# Patient Record
Sex: MALE | Race: WHITE | NOT HISPANIC OR LATINO | URBAN - METROPOLITAN AREA
[De-identification: names, ages, dates, MRNs, and addresses within clinical notes are randomized per-mention and may not be internally consistent; named-entity substitution may affect disease eponyms.]

---

## 2017-04-23 ENCOUNTER — HOSPITAL ENCOUNTER (OUTPATIENT)
Dept: MRI IMAGING | Facility: CLINIC | Age: 42
Discharge: HOME | End: 2017-04-23
Attending: PODIATRIST | Admitting: PODIATRIST
Payer: COMMERCIAL

## 2017-04-23 DIAGNOSIS — M79.673 FOOT PAIN: ICD-10-CM

## 2017-04-23 PROCEDURE — 73718 MRI LOWER EXTREMITY W/O DYE: CPT | Mod: LT

## 2017-04-27 ENCOUNTER — TRANSFERRED RECORDS (OUTPATIENT)
Dept: HEALTH INFORMATION MANAGEMENT | Facility: CLINIC | Age: 42
End: 2017-04-27

## 2017-05-02 ENCOUNTER — ANESTHESIA EVENT (OUTPATIENT)
Dept: SURGERY | Facility: CLINIC | Age: 42
End: 2017-05-02
Payer: COMMERCIAL

## 2017-05-08 ENCOUNTER — HOSPITAL ENCOUNTER (OUTPATIENT)
Facility: CLINIC | Age: 42
Discharge: HOME | End: 2017-05-08
Attending: PODIATRIST | Admitting: PODIATRIST
Payer: COMMERCIAL

## 2017-05-08 ENCOUNTER — APPOINTMENT (OUTPATIENT)
Dept: GENERAL RADIOLOGY | Facility: CLINIC | Age: 42
End: 2017-05-08
Attending: PODIATRIST
Payer: COMMERCIAL

## 2017-05-08 ENCOUNTER — ANESTHESIA (OUTPATIENT)
Dept: SURGERY | Facility: CLINIC | Age: 42
End: 2017-05-08
Payer: COMMERCIAL

## 2017-05-08 VITALS
HEIGHT: 72 IN | BODY MASS INDEX: 34.54 KG/M2 | RESPIRATION RATE: 15 BRPM | OXYGEN SATURATION: 95 % | SYSTOLIC BLOOD PRESSURE: 164 MMHG | DIASTOLIC BLOOD PRESSURE: 88 MMHG | WEIGHT: 255 LBS | TEMPERATURE: 97.7 F

## 2017-05-08 PROCEDURE — 88305 TISSUE EXAM BY PATHOLOGIST: CPT | Mod: 26 | Performed by: PODIATRIST

## 2017-05-08 PROCEDURE — 25000128 H RX IP 250 OP 636: Performed by: NURSE ANESTHETIST, CERTIFIED REGISTERED

## 2017-05-08 PROCEDURE — S0020 INJECTION, BUPIVICAINE HYDRO: HCPCS | Performed by: PODIATRIST

## 2017-05-08 PROCEDURE — 25000125 ZZHC RX 250: Performed by: PODIATRIST

## 2017-05-08 PROCEDURE — 36000058 ZZH SURGERY LEVEL 3 EA 15 ADDTL MIN: Performed by: PODIATRIST

## 2017-05-08 PROCEDURE — 71000027 ZZH RECOVERY PHASE 2 EACH 15 MINS: Performed by: PODIATRIST

## 2017-05-08 PROCEDURE — 25000132 ZZH RX MED GY IP 250 OP 250 PS 637: Performed by: PODIATRIST

## 2017-05-08 PROCEDURE — 25800025 ZZH RX 258: Performed by: NURSE ANESTHETIST, CERTIFIED REGISTERED

## 2017-05-08 PROCEDURE — 25000125 ZZHC RX 250: Performed by: NURSE ANESTHETIST, CERTIFIED REGISTERED

## 2017-05-08 PROCEDURE — 40000277 XR SURGERY CARM FLUORO LESS THAN 5 MIN W STILLS

## 2017-05-08 PROCEDURE — 36000060 ZZH SURGERY LEVEL 3 W FLUORO 1ST 30 MIN: Performed by: PODIATRIST

## 2017-05-08 PROCEDURE — 37000009 ZZH ANESTHESIA TECHNICAL FEE, EACH ADDTL 15 MIN: Performed by: PODIATRIST

## 2017-05-08 PROCEDURE — 25000128 H RX IP 250 OP 636: Performed by: PODIATRIST

## 2017-05-08 PROCEDURE — C1713 ANCHOR/SCREW BN/BN,TIS/BN: HCPCS | Performed by: PODIATRIST

## 2017-05-08 PROCEDURE — 40000305 ZZH STATISTIC PRE PROC ASSESS I: Performed by: PODIATRIST

## 2017-05-08 PROCEDURE — 27210794 ZZH OR GENERAL SUPPLY STERILE: Performed by: PODIATRIST

## 2017-05-08 PROCEDURE — C1769 GUIDE WIRE: HCPCS | Performed by: PODIATRIST

## 2017-05-08 PROCEDURE — 88305 TISSUE EXAM BY PATHOLOGIST: CPT | Performed by: PODIATRIST

## 2017-05-08 PROCEDURE — 37000008 ZZH ANESTHESIA TECHNICAL FEE, 1ST 30 MIN: Performed by: PODIATRIST

## 2017-05-08 DEVICE — IMPLANTABLE DEVICE: Type: IMPLANTABLE DEVICE | Site: FOOT | Status: FUNCTIONAL

## 2017-05-08 DEVICE — IMP SCR ARTHREX QUICKFIX CANC PT 3.0X36MM TI AR-8730-36PT: Type: IMPLANTABLE DEVICE | Site: FOOT | Status: FUNCTIONAL

## 2017-05-08 DEVICE — IMP SCR ARTHREX MTP LP CORTICAL 3X16MM TI AR-8933-16: Type: IMPLANTABLE DEVICE | Site: FOOT | Status: FUNCTIONAL

## 2017-05-08 DEVICE — IMP SCR ARTHREX CORTICAL LP 3X18MM TI AR-8933-18: Type: IMPLANTABLE DEVICE | Site: FOOT | Status: FUNCTIONAL

## 2017-05-08 RX ORDER — LIDOCAINE HYDROCHLORIDE 20 MG/ML
INJECTION, SOLUTION INFILTRATION; PERINEURAL PRN
Status: DISCONTINUED | OUTPATIENT
Start: 2017-05-08 | End: 2017-05-08 | Stop reason: HOSPADM

## 2017-05-08 RX ORDER — FENTANYL CITRATE 50 UG/ML
INJECTION, SOLUTION INTRAMUSCULAR; INTRAVENOUS PRN
Status: DISCONTINUED | OUTPATIENT
Start: 2017-05-08 | End: 2017-05-08

## 2017-05-08 RX ORDER — ONDANSETRON 2 MG/ML
INJECTION INTRAMUSCULAR; INTRAVENOUS PRN
Status: DISCONTINUED | OUTPATIENT
Start: 2017-05-08 | End: 2017-05-08

## 2017-05-08 RX ORDER — OXYCODONE AND ACETAMINOPHEN 5; 325 MG/1; MG/1
1-2 TABLET ORAL EVERY 4 HOURS PRN
Qty: 36 TABLET | Refills: 0 | Status: SHIPPED | OUTPATIENT
Start: 2017-05-08

## 2017-05-08 RX ORDER — CEFAZOLIN SODIUM 2 G/100ML
2 INJECTION, SOLUTION INTRAVENOUS
Status: COMPLETED | OUTPATIENT
Start: 2017-05-08 | End: 2017-05-08

## 2017-05-08 RX ORDER — PROPOFOL 10 MG/ML
INJECTION, EMULSION INTRAVENOUS PRN
Status: DISCONTINUED | OUTPATIENT
Start: 2017-05-08 | End: 2017-05-08

## 2017-05-08 RX ORDER — KETOROLAC TROMETHAMINE 30 MG/ML
INJECTION, SOLUTION INTRAMUSCULAR; INTRAVENOUS PRN
Status: DISCONTINUED | OUTPATIENT
Start: 2017-05-08 | End: 2017-05-08

## 2017-05-08 RX ORDER — CEFAZOLIN SODIUM 1 G/3ML
1 INJECTION, POWDER, FOR SOLUTION INTRAMUSCULAR; INTRAVENOUS SEE ADMIN INSTRUCTIONS
Status: DISCONTINUED | OUTPATIENT
Start: 2017-05-08 | End: 2017-05-08 | Stop reason: HOSPADM

## 2017-05-08 RX ORDER — NAPROXEN 500 MG/1
500 TABLET ORAL
COMMUNITY
Start: 2017-04-17

## 2017-05-08 RX ORDER — HYDROXYZINE HYDROCHLORIDE 25 MG/1
25 TABLET, FILM COATED ORAL EVERY 6 HOURS PRN
Qty: 36 TABLET | Refills: 0 | Status: SHIPPED | OUTPATIENT
Start: 2017-05-08

## 2017-05-08 RX ORDER — OXYCODONE AND ACETAMINOPHEN 5; 325 MG/1; MG/1
1-2 TABLET ORAL
Status: COMPLETED | OUTPATIENT
Start: 2017-05-08 | End: 2017-05-08

## 2017-05-08 RX ORDER — LIDOCAINE 40 MG/G
CREAM TOPICAL
Status: DISCONTINUED | OUTPATIENT
Start: 2017-05-08 | End: 2017-05-08 | Stop reason: HOSPADM

## 2017-05-08 RX ORDER — SODIUM CHLORIDE, SODIUM LACTATE, POTASSIUM CHLORIDE, CALCIUM CHLORIDE 600; 310; 30; 20 MG/100ML; MG/100ML; MG/100ML; MG/100ML
INJECTION, SOLUTION INTRAVENOUS CONTINUOUS
Status: DISCONTINUED | OUTPATIENT
Start: 2017-05-08 | End: 2017-05-08 | Stop reason: HOSPADM

## 2017-05-08 RX ORDER — DEXAMETHASONE SODIUM PHOSPHATE 4 MG/ML
INJECTION, SOLUTION INTRA-ARTICULAR; INTRALESIONAL; INTRAMUSCULAR; INTRAVENOUS; SOFT TISSUE PRN
Status: DISCONTINUED | OUTPATIENT
Start: 2017-05-08 | End: 2017-05-08

## 2017-05-08 RX ORDER — BUPIVACAINE HYDROCHLORIDE 5 MG/ML
INJECTION, SOLUTION PERINEURAL PRN
Status: DISCONTINUED | OUTPATIENT
Start: 2017-05-08 | End: 2017-05-08 | Stop reason: HOSPADM

## 2017-05-08 RX ADMIN — PROPOFOL 100 MG: 10 INJECTION, EMULSION INTRAVENOUS at 10:40

## 2017-05-08 RX ADMIN — SODIUM CHLORIDE, POTASSIUM CHLORIDE, SODIUM LACTATE AND CALCIUM CHLORIDE: 600; 310; 30; 20 INJECTION, SOLUTION INTRAVENOUS at 09:12

## 2017-05-08 RX ADMIN — MIDAZOLAM HYDROCHLORIDE 5 MG: 1 INJECTION, SOLUTION INTRAMUSCULAR; INTRAVENOUS at 10:27

## 2017-05-08 RX ADMIN — PROPOFOL 100 MG: 10 INJECTION, EMULSION INTRAVENOUS at 11:05

## 2017-05-08 RX ADMIN — PROPOFOL 100 MG: 10 INJECTION, EMULSION INTRAVENOUS at 10:35

## 2017-05-08 RX ADMIN — FENTANYL CITRATE 100 MCG: 50 INJECTION, SOLUTION INTRAMUSCULAR; INTRAVENOUS at 10:32

## 2017-05-08 RX ADMIN — KETOROLAC TROMETHAMINE 30 MG: 30 INJECTION, SOLUTION INTRAMUSCULAR at 10:30

## 2017-05-08 RX ADMIN — FENTANYL CITRATE 100 MCG: 50 INJECTION, SOLUTION INTRAMUSCULAR; INTRAVENOUS at 10:40

## 2017-05-08 RX ADMIN — SODIUM CHLORIDE, POTASSIUM CHLORIDE, SODIUM LACTATE AND CALCIUM CHLORIDE: 600; 310; 30; 20 INJECTION, SOLUTION INTRAVENOUS at 11:00

## 2017-05-08 RX ADMIN — PROPOFOL 100 MG: 10 INJECTION, EMULSION INTRAVENOUS at 10:45

## 2017-05-08 RX ADMIN — OXYCODONE HYDROCHLORIDE AND ACETAMINOPHEN 1 TABLET: 5; 325 TABLET ORAL at 12:51

## 2017-05-08 RX ADMIN — ONDANSETRON 4 MG: 2 INJECTION INTRAMUSCULAR; INTRAVENOUS at 10:30

## 2017-05-08 RX ADMIN — CEFAZOLIN SODIUM 2 G: 2 INJECTION, SOLUTION INTRAVENOUS at 10:26

## 2017-05-08 RX ADMIN — LIDOCAINE HYDROCHLORIDE 1 ML: 10 INJECTION, SOLUTION INFILTRATION; PERINEURAL at 09:12

## 2017-05-08 RX ADMIN — PROPOFOL 100 MG: 10 INJECTION, EMULSION INTRAVENOUS at 11:00

## 2017-05-08 RX ADMIN — DEXAMETHASONE SODIUM PHOSPHATE 4 MG: 4 INJECTION, SOLUTION INTRA-ARTICULAR; INTRALESIONAL; INTRAMUSCULAR; INTRAVENOUS; SOFT TISSUE at 10:30

## 2017-05-08 RX ADMIN — PROPOFOL 100 MG: 10 INJECTION, EMULSION INTRAVENOUS at 10:48

## 2017-05-08 RX ADMIN — FENTANYL CITRATE 100 MCG: 50 INJECTION, SOLUTION INTRAMUSCULAR; INTRAVENOUS at 10:56

## 2017-05-08 RX ADMIN — MIDAZOLAM HYDROCHLORIDE 2 MG: 1 INJECTION, SOLUTION INTRAMUSCULAR; INTRAVENOUS at 10:57

## 2017-05-08 ASSESSMENT — LIFESTYLE VARIABLES: TOBACCO_USE: 1

## 2017-05-08 NOTE — OP NOTE
DATE OF PROCEDURE:  05/08/2017      SURGEON:  Dr. Mono Amanda, DPM, FACFAS     PREOPERATIVE DIAGNOSES:   1.  Left first metatarsophalangeal joint arthritic pain.   2.  Concern for underlying degree of arthritis.   3.  Concern for underlying synovium involvement.      POSTOPERATIVE DIAGNOSES:   1.  Left first metatarsophalangeal joint arthritic pain.   2.  Concern for underlying degree of arthritis.   3.  Concern for underlying synovium involvement.   4.  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   5.  Post-gouty arthropathy, left first MTP.      PROCEDURES PERFORMED:   1.  Left first metatarsophalangeal joint arthrodesis and bone grafting.   2.  Synovial debridement and biopsy left first MTP.   3.  Intraoperative fluoroscopy.      ANESTHESIA:  Monitored anesthesia care with local and regional.      HEMOSTASIS:  Left ankle tourniquet at 250 mmHg.      ESTIMATED BLOOD LOSS:  Less than 5 mL.      FINDINGS:  End-stage arthritic changes about the first MTP with reactive synovium indicative of gouty arthropathy with crystal deposition.  A large central defect of the metatarsal, taking up approximately 50-60% of the majority of the articular surface.      IMPLANTS:  Arthrex 3.0 compression screw and dorsal long anatomic reconstruction plate to fixate the first metatarsophalangeal arthrodesis.      INDICATIONS FOR OPERATION:  Mr. Kirill Fountain is a 41-year-old who has been followed and evaluated in clinic for left first MTPJ pain.  This began after a subtle injury and has continued to progress become more problematic arthritic swollen, painful and tender.  He had no known prior history of gouty arthropathy.  An MRI did reveal significant degenerative changes about the first MTP.  Appropriate evaluation of the joint was indicated.  He was made aware of the procedural risks, the pros, cons, benefits and alternatives, the postop course and details.  Consent was obtained and patient agreed for open evaluation  arthrodesis versus repair.      DESCRIPTION OF PROCEDURE:  The patient was identified in the preoperative holding area.  The surgical site was marked.  The extremity initialed, H&P reviewed and consent confirmed.  He was transported to the OR, placed in supine on the OR table.  IV antibiotics were administered.  Anesthesia care was administered.  Left ankle tourniquet applied.  Foot and ankle was prepped and draped sterilely.  Timeout then performed to identify the proper patient, surgical site and procedure to be performed.  An Esmarch was utilized to exsanguinate the left lower extremity, and the ankle tourniquet inflated for the duration of procedure.  Following this, first MTP incision was made about the dorsomedial aspect approximately 10 cm in linear length.  This was dissected down in layers with neurovascular identification and retraction.  Upon gaining access to the joint, significant reactive synovial was appreciated.  This was inflamed and reactive.  This was debrided and gouty deposition crystals were appreciated.  This was sent to pathology for gross microscopic evaluation.  The joint was inspected and found to be with gross osteocartilaginous changes with large central defect involving 50%-60% of the joint surface about the metatarsal side.  Secondary to this with a large degree of osteoarthritic changes patient's condition warranted arthrodesis.  This was then conducted with intramedullary guidance cup and cone reaming with 22 cup and cone reaming system to violate the subchondral bone plate.  This was further fenestrated with a 2.0 drill bit to allow for bony integration.  Some of the autogenous bone was morcellized and packed in about the arthrodesis site after it was irrigated and aligned.  A compression screw from distal medial to proximal lateral was placed with intraoperative fluoroscopic assistance.  A 36 mm 3.0 compression screw was placed.  A dorsal long anatomic Recon plate was applied with  first nonlocking screws to gain additional compression and locking screws to obtain stability neutralization in a large healthy male.  This was irrigated and closed in layers after confirmation of fluoroscopic images obtained in AP, oblique and lateral views confirming alignment and fixation.  After irrigation, closure was completed with 2-0 and 3-0 Vicryl and 3-0 nylon.  Compressive sterile dressing was applied.  Vascular status was intact with deflation of tourniquet.      COMPLICATIONS:  No direct complications encountered throughout the case.      He did tolerate these procedures and anesthesia well and left the OR to the PACU with stable vital signs and vascular status intact to operative extremity.  In the PACU, the patient was given postoperative instructions to be weightbearing as tolerated in surgical shoe with ice, elevation and p.o. pain medication and crutches prescribed.  Clinical followup in 10-14 days for recheck and suture removal.  They will contact the clinic with any other interval events or concerns.  Case care reviewed today with patient and family member postoperatively.  Postoperative instruction sheet provided.  If concerns develop, they will contact us.         CORA HOLT DPM             D: 2017 11:28   T: 2017 12:16   MT: PROSPER#[id number]      Name:     MAURISIO COLBY   MRN:      [id number]        Account:        [id number]   :      1975           Procedure Date: 2017      Document: [id number]       cc: [facility]

## 2017-05-08 NOTE — IP AVS SNAPSHOT
[facility] PreOP/Phase II    [address] [address]    Phone:  [phone]    Fax:  [phone]                                       After Visit Summary   5/8/2017    Kirill Fountain    MRN: [id number]           After Visit Summary Signature Page     I have received my discharge instructions, and my questions have been answered. I have discussed any challenges I see with this plan with the nurse or doctor.    ..........................................................................................................................................  Patient/Patient Representative Signature      ..........................................................................................................................................  Patient Representative Print Name and Relationship to Patient    ..................................................               ................................................  Date                                            Time    ..........................................................................................................................................  Reviewed by Signature/Title    ...................................................              ..............................................  Date                                                            Time

## 2017-05-08 NOTE — IP AVS SNAPSHOT
MRN:[id number]                      After Visit Summary   5/8/2017    Kirill Fountain    MRN: [id number]           Thank you!     Thank you for choosing [facility] for your care. Our goal is always to provide you with excellent care. Hearing back from our patients is one way we can continue to improve our services. Please take a few minutes to complete the written survey that you may receive in the mail after you visit with us. Thank you!        Patient Information     Date Of Birth          1975        About your hospital stay     You were admitted on:  May 8, 2017 You last received care in the:  [facility] PreOP/Phase II    You were discharged on:  May 8, 2017       Who to Call     For medical emergencies, please call 911.  For non-urgent questions about your medical care, please call your primary care provider or clinic, [phone]  For questions related to your surgery, please call your surgery clinic        Attending Provider     Provider Mono Corea DPM Podiatry       Primary Care Provider Office Phone # Fax #    Manfred Rosario [phone]       [facility] [address] [place] [address]        After Care Instructions     Discharge Instructions       Review discharge instructions as directed by Provider.            Discharge Instructions       Patient to be seen in next 10-14 days.    Please call [facility] at [phone] to make/confirm appointment.            Elevate affected extremity           Ice to affected area       Ice pack to affected area PRN (as needed).            No dressing change       No dressing change x 1 week.  Then, dressing change and peroxide cleanse as instructed.            No driving or operating machinery       until the day after procedure            Weight bearing status - As tolerated           Weight bearing status - Foot flat                 Further instructions from your  care team                           Same Day Surgery Discharge Instructions  Special Precautions After Surgery - Adult    1. It is not unusual to feel lightheaded or faint, up to 24 hours after surgery or while taking pain medication.  If you have these symptoms; sit for a few minutes before standing and have someone assist you when getting up.  2. You should rest and relax for the next 24 hours and must have someone stay with you for at least 24 hours after your discharge.  3. DO NOT DRIVE any vehicle or operate mechanical equipment for 24 hours following the end of your surgery.  DO NOT DRIVE while taking narcotic pain medications that have been prescribed by your physician.  If you had a limb operated on, you must be able to use it fully to drive.  4. DO NOT drink alcoholic beverages for 24 hours following surgery or while taking prescription pain medication.  5. Drink clear liquids (apple juice, ginger ale, broth, 7-Up, etc.).  Progress to your regular diet as you feel able.  6. Any questions call your physician and do not make important decisions for 24 hours.    ACTIVITY  ? Rest today.  No activity or diet restrictions.  ? Resume activity as tolerated.  ? Restrictions: weight bear as tolerated using crutches and post op shoe  ? See printed discharge sheet.     INCISIONAL CARE  ? May remove dressing in 7 days if comfortable and then clean incision with a little hydrogen peroxide, and then recover with a dressing.  Do not immerse in water.  ? Keep extremity elevated above the level of the heart if possible. .  ? Apply ice 1/2 hour on and 1/2 hour off while awake.  ? Be alert for signs of infection:  redness, swelling, heat, drainage of pus, and/or elevated temperature.  Contact your doctor if these occur.        Call for an appointment to return to the clinic in 10-14 days.    Medications:  ? Acetaminophen & Oxycodone (Percocet):  Last dose at 1 pm.  ? Hydroxyzine (Vistaril):  Next dose: as needed.  ? Ibuprofen  "(Motrin, Advil): Last dose at 10:30.  ? Follow the instructions on the bottle.     Additional discharge instructions:Follow MD handout for exercises and cares  __________________________________________________________________________________________________________________________________  IMPORTANT NUMBERS:    [facility] [place] Main Number:  [phone], [phone]  Pharmacy:  [phone]  Same Day Surgery:  [phone], Monday - Friday until 8:30 p.m.  Urgent Care:  [phone]  Emergency Room:  [phone]      [facility]:  [phone]             Pending Results     Date and Time Order Name Status Description    2017 0405 XR Surgery YAMILET Fluoro L/T 5 Min w Stills In process             Admission Information     Date & Time Provider Department Dept. Phone    2017 Moon Amanda, CLARISSE [facility] PreOP/Phase [phone]      Your Vitals Were     Blood Pressure Temperature Respirations Height Weight Pulse Oximetry    131/79 97.7  F (36.5  C) (Oral) 16 1.829 m (6') 115.7 kg (255 lb) 95%    BMI (Body Mass Index)                   34.58 kg/m2           Klique Information     Klique lets you send messages to your doctor, view your test results, renew your prescriptions, schedule appointments and more. To sign up, go to [email or web address] . Click on \"Log in\" on the left side of the screen, which will take you to the Welcome page. Then click on \"Sign up Now\" on the right side of the page.     You will be asked to enter the access code listed below, as well as some personal information. Please follow the directions to create your username and password.     Your access code is: [email or web address]  Expires: 2017 12:28 PM     Your access code will  in 90 days. If you need help or a new code, please call your [place] clinic or [phone].        Care EveryWhere ID     This is your Care EveryWhere ID. This could be used by other organizations to access your [facility] [place] medical " records  [id number]           Review of your medicines      START taking        Dose / Directions    hydrOXYzine 25 MG tablet   Commonly known as:  ATARAX        Dose:  25 mg   Take 1 tablet (25 mg) by mouth every 6 hours as needed for itching (and nausea)   Quantity:  36 tablet   Refills:  0       oxyCODONE-acetaminophen 5-325 MG per tablet   Commonly known as:  PERCOCET        Dose:  1-2 tablet   Take 1-2 tablets by mouth every 4 hours as needed for pain (moderate to severe)   Quantity:  36 tablet   Refills:  0         CONTINUE these medicines which have NOT CHANGED        Dose / Directions    naproxen 500 MG tablet   Commonly known as:  NAPROSYN        Dose:  500 mg   Take 500 mg by mouth   Refills:  0            Where to get your medicines      Some of these will need a paper prescription and others can be bought over the counter. Ask your nurse if you have questions.     Bring a paper prescription for each of these medications     hydrOXYzine 25 MG tablet    oxyCODONE-acetaminophen 5-325 MG per tablet                Protect others around you: Learn how to safely use, store and throw away your medicines at www.disposemymeds.org.             Medication List: This is a list of all your medications and when to take them. Check marks below indicate your daily home schedule. Keep this list as a reference.      Medications           Morning Afternoon Evening Bedtime As Needed    hydrOXYzine 25 MG tablet   Commonly known as:  ATARAX   Take 1 tablet (25 mg) by mouth every 6 hours as needed for itching (and nausea)                                naproxen 500 MG tablet   Commonly known as:  NAPROSYN   Take 500 mg by mouth                                oxyCODONE-acetaminophen 5-325 MG per tablet   Commonly known as:  PERCOCET   Take 1-2 tablets by mouth every 4 hours as needed for pain (moderate to severe)

## 2017-05-08 NOTE — DISCHARGE INSTRUCTIONS
Same Day Surgery Discharge Instructions  Special Precautions After Surgery - Adult    1. It is not unusual to feel lightheaded or faint, up to 24 hours after surgery or while taking pain medication.  If you have these symptoms; sit for a few minutes before standing and have someone assist you when getting up.  2. You should rest and relax for the next 24 hours and must have someone stay with you for at least 24 hours after your discharge.  3. DO NOT DRIVE any vehicle or operate mechanical equipment for 24 hours following the end of your surgery.  DO NOT DRIVE while taking narcotic pain medications that have been prescribed by your physician.  If you had a limb operated on, you must be able to use it fully to drive.  4. DO NOT drink alcoholic beverages for 24 hours following surgery or while taking prescription pain medication.  5. Drink clear liquids (apple juice, ginger ale, broth, 7-Up, etc.).  Progress to your regular diet as you feel able.  6. Any questions call your physician and do not make important decisions for 24 hours.    ACTIVITY  ? Rest today.  No activity or diet restrictions.  ? Resume activity as tolerated.  ? Restrictions: weight bear as tolerated using crutches and post op shoe  ? See printed discharge sheet.     INCISIONAL CARE  ? May remove dressing in 7 days if comfortable and then clean incision with a little hydrogen peroxide, and then recover with a dressing.  Do not immerse in water.  ? Keep extremity elevated above the level of the heart if possible. .  ? Apply ice 1/2 hour on and 1/2 hour off while awake.  ? Be alert for signs of infection:  redness, swelling, heat, drainage of pus, and/or elevated temperature.  Contact your doctor if these occur.        Call for an appointment to return to the clinic in 10-14 days.    Medications:  ? Acetaminophen & Oxycodone (Percocet):  Last dose at 1 pm.  ? Hydroxyzine (Vistaril):  Next dose: as needed.  ? Ibuprofen (Motrin, Advil):  Last dose at 10:30.  ? Follow the instructions on the bottle.     Additional discharge instructions:Follow MD handout for exercises and cares  __________________________________________________________________________________________________________________________________  IMPORTANT NUMBERS:    [facility] [place]:  [phone], [phone]  Pharmacy:  [phone]  Same Day Surgery:  [phone], Monday - Friday until 8:30 p.m.  Urgent Care:  [phone]  Emergency Room:  [phone]      [facility]:  [phone]

## 2017-05-08 NOTE — OP NOTE
Operative report will be dictated/completed.    Mono Amanda DPM, FACFAS  Foot & Ankle Surgeon/Specialist  [facility]

## 2017-05-08 NOTE — ANESTHESIA POSTPROCEDURE EVALUATION
Patient: Kirill Fountain    Procedure(s):  Left Foot 1st Metatarsophalangeal Joint Open Evaluation,Joint Repair vs Arthrodesis - Wound Class: I-Clean    Diagnosis:other  Diagnosis Additional Information: No value filed.    Anesthesia Type:  MAC, General, LMA    Note:  Anesthesia Post Evaluation    Patient location during evaluation: Bedside  Patient participation: Able to fully participate in evaluation  Level of consciousness: awake and alert  Pain management: adequate  Airway patency: patent  Cardiovascular status: acceptable  Respiratory status: acceptable  Hydration status: acceptable  PONV: none     Anesthetic complications: None          Last vitals:  Vitals:    05/08/17 0803 05/08/17 1126   BP: (!) 139/100 131/79   Resp: 16 16   Temp: 36.8  C (98.2  F) 36.5  C (97.7  F)   SpO2: 95% 95%         Electronically Signed By: JONATHAN Garcia CRNA  May 8, 2017  11:39 AM

## 2017-05-08 NOTE — ANESTHESIA PREPROCEDURE EVALUATION
Anesthesia Evaluation     . Pt has had prior anesthetic. Type: MAC and General           ROS/MED HX    ENT/Pulmonary:     (+)tobacco use, Current use , . .   Recent URI: esigs.   Neurologic:  - neg neurologic ROS     Cardiovascular:  - neg cardiovascular ROS       METS/Exercise Tolerance:  >4 METS   Hematologic:  - neg hematologic  ROS       Musculoskeletal:  - neg musculoskeletal ROS       GI/Hepatic:  - neg GI/hepatic ROS       Renal/Genitourinary:  - ROS Renal section negative       Endo:  - neg endo ROS    (-) Type I DM   Psychiatric:         Infectious Disease:  - neg infectious disease ROS       Malignancy:      - no malignancy   Other:    - neg other ROS                 Physical Exam  Normal systems: cardiovascular and pulmonary    Airway   Mallampati: II  TM distance: >3 FB  Neck ROM: full    Dental   (+) chipped    Cardiovascular       Pulmonary     Other findings: Front two teeth chipped                Anesthesia Plan      History & Physical Review  History and physical reviewed and following examination; no interval change.    ASA Status:  2 .    NPO Status:  > 8 hours    Plan for MAC, General and LMA with Intravenous and Propofol induction. Maintenance will be Balanced.  Reason for MAC:  Other - see comments  PONV prophylaxis:  Ondansetron (or other 5HT-3) and Dexamethasone or Solumedrol       Postoperative Care  Postoperative pain management:  IV analgesics and Oral pain medications.      Consents  Anesthetic plan, risks, benefits and alternatives discussed with:  Patient..                          .

## 2017-05-08 NOTE — BRIEF OP NOTE
[facility] OR   Brief Operative Note    Pre-operative diagnosis: other   Post-operative diagnosis * No post-op diagnosis entered *   Procedure: Procedure(s):  Left Foot 1st Metatarsophalangeal Joint Open Evaluation,Joint Repair vs Arthrodesis - Wound Class: I-Clean   Surgeon: Mono Amanda DPM   Anesthesia: Monitor Anesthesia Care    Estimated blood loss: Less than 10 ml   Blood transfusion: No transfusion was given during surgery   Drains: None   Specimens: None   Findings: Left 1st MTPJ with significant degenerative osteoarthritis and gross evidence of gouty arthropathy with large centeral 50-60% osteochondral defect of the 1st metatarsal head.   Complications: None   Condition: Stable   Comments: See dictated operative report for full details.           Mono Amanda DPM, FACFAS  Foot & Ankle Surgeon/Specialist  [facility]

## 2017-05-11 LAB — COPATH REPORT: NORMAL

## 2017-12-02 ENCOUNTER — AMBULATORY - HEALTHEAST (OUTPATIENT)
Dept: NURSING | Facility: CLINIC | Age: 42
End: 2017-12-02

## 2023-06-01 ENCOUNTER — HEALTH MAINTENANCE LETTER (OUTPATIENT)
Age: 48
End: 2023-06-01

## (undated) DEVICE — SPLINT FIBERGLASS 4X30" PRE-CUT RESIN 76430

## (undated) DEVICE — PREP CHLORAPREP 26ML TINTED ORANGE  260815

## (undated) DEVICE — DRSG GAUZE 4X4" TRAY

## (undated) DEVICE — REAMER ARTHREX METATARSAL 22MM AR-8944MR-22

## (undated) DEVICE — REAMER ARTHREX PHALANGEAL 22MM AR-8944PR-22

## (undated) DEVICE — DRILL BIT ARTHREX LPS CAN 2.0MM AR-8933-20C

## (undated) DEVICE — GUIDE WIRE ARTHREX W/TROCAR TIP .062" AR-8941K

## (undated) DEVICE — PACK EXTREMITY LATEX FREE SOP32HFFCS

## (undated) DEVICE — DRILL BIT ARTHREX MTP 2.0MM AR-8944-22

## (undated) DEVICE — DRILL BIT ARTHREX BB TAK MTP THREADED AR-13226T

## (undated) DEVICE — CAST PADDING 4" STERILE 9044S

## (undated) DEVICE — GLOVE PROTEXIS POWDER FREE 7.5 ORTHOPEDIC 2D73ET75

## (undated) DEVICE — BLADE SAW OSCILLATING STRYK MED 9.0X25X0.38MM 2296-003-111

## (undated) DEVICE — DRAPE STERI TOWEL SM 1000

## (undated) DEVICE — DRAPE SHEET REV FOLD 3/4 9349

## (undated) DEVICE — GOWN LG DISP 9515

## (undated) DEVICE — DRAPE EXTREMITY W/ARMBOARD 29405

## (undated) DEVICE — GLOVE PROTEXIS POWDER FREE 8.0 ORTHOPEDIC 2D73ET80

## (undated) DEVICE — DRSG ABDOMINAL 07 1/2X8" 7197D

## (undated) DEVICE — DRSG ADAPTIC 3X3" 6112

## (undated) DEVICE — SU VICRYL 2-0 SH 27" UND J417H

## (undated) DEVICE — SOL WATER IRRIG 1000ML BOTTLE 2F7114

## (undated) DEVICE — GUIDEWIRE THRD TROCAR TIP .045" W/LASER LINE AR-8737-05

## (undated) DEVICE — DECANTER VIAL 2006S

## (undated) DEVICE — SU ETHILON 3-0 PS-2 18" 1669H

## (undated) DEVICE — BNDG ELASTIC 4" DBL LENGTH UNSTERILE 6611-14

## (undated) DEVICE — SOL NACL 0.9% IRRIG 1000ML BOTTLE 07138-09

## (undated) RX ORDER — PROPOFOL 10 MG/ML
INJECTION, EMULSION INTRAVENOUS
Status: DISPENSED
Start: 2017-05-08

## (undated) RX ORDER — FENTANYL CITRATE 50 UG/ML
INJECTION, SOLUTION INTRAMUSCULAR; INTRAVENOUS
Status: DISPENSED
Start: 2017-05-08

## (undated) RX ORDER — OXYCODONE AND ACETAMINOPHEN 5; 325 MG/1; MG/1
TABLET ORAL
Status: DISPENSED
Start: 2017-05-08

## (undated) RX ORDER — KETOROLAC TROMETHAMINE 30 MG/ML
INJECTION, SOLUTION INTRAMUSCULAR; INTRAVENOUS
Status: DISPENSED
Start: 2017-05-08